# Patient Record
Sex: FEMALE | Race: WHITE | NOT HISPANIC OR LATINO | Employment: FULL TIME | URBAN - METROPOLITAN AREA
[De-identification: names, ages, dates, MRNs, and addresses within clinical notes are randomized per-mention and may not be internally consistent; named-entity substitution may affect disease eponyms.]

---

## 2017-12-11 NOTE — PATIENT INSTRUCTIONS

## 2017-12-11 NOTE — PROGRESS NOTES
HCM:  Hep C done by rheumatology and negative  Colon Cancer Screen- never   Mammo Screen on 2012: Impression BI-RADS - 1, negative.   Flu vaccine declined   SUBJECTIVE:   CC: Bonny Alfredo is an 52 year old woman who presents for preventive health visit.     CULLEN for [facility] for pap results  2012 was last mammogram  Eye iritis [place] [facility] needs to schedule Glasses  Dental UTD    Xmas with sister  Mother  Thanksgiving day  Living with dad for now during transition  Going home for the weekend- [place]    Sleep 7 pm to 3 am  Appetite ok  Exercise volleyball    Smoking no  ETOH rare  Street drugs/MJ no  Caffeine coffee and pop    Pain 1/10  Stiff in am 15 min.    No seizure or LOC  Fall in October on stairs without injury    Travel no  Exposure no        Healthy Habits:    Do you get at least three servings of calcium containing foods daily (dairy, green leafy vegetables, etc.)? yes    Amount of exercise or daily activities, outside of work: 1 day(s) per week    Problems taking medications regularly No    Medication side effects: No    Have you had an eye exam in the past two years? yes    Do you see a dentist twice per year? yes    Do you have sleep apnea, excessive snoring or daytime drowsiness?no      HEARING FREQUENCY:   Right Ear: Passed   Left Ear: Passed    Left ear swishing noise and ache.     Today's PHQ-2 Score:   PHQ-2 (  [employer]) 2017   Q1: Little interest or pleasure in doing things 0   Q2: Feeling down, depressed or hopeless 0   PHQ-2 Score 0         Abuse: Current or Past(Physical, Sexual or Emotional)- No  Do you feel safe in your environment - Yes  Social History   Substance Use Topics     Smoking status: Never Smoker     Smokeless tobacco: Never Used     Alcohol use Yes      Comment: twice per month- wine     The patient does not drink >3 drinks per day nor >7 drinks per week.    Reviewed orders with patient.  Reviewed health maintenance and updated orders  "accordingly - Yes  BP Readings from Last 3 Encounters:   12/14/17 118/64   07/13/16 124/80   06/30/16 124/72    Wt Readings from Last 3 Encounters:   12/14/17 83.9 kg (185 lb)   07/13/16 91.2 kg (201 lb)   06/30/16 93.4 kg (206 lb)       Estimated body mass index is 33.62 kg/(m^2) as calculated from the following:    Height as of this encounter: 1.58 m (5' 2.2\").    Weight as of this encounter: 83.9 kg (185 lb).  Weight loss is recommended           Patient Active Problem List   Diagnosis     Ankylosing spondylitis (H)     Iritis     Past Surgical History:   Procedure Laterality Date     TONSILLECTOMY & ADENOIDECTOMY      Age 4       Social History   Substance Use Topics     Smoking status: Never Smoker     Smokeless tobacco: Never Used     Alcohol use Yes      Comment: twice per month- wine     Family History   Problem Relation Age of Onset     CANCER Mother      Lymphoma     Chronic Obstructive Pulmonary Disease Maternal Grandfather      HEART DISEASE Paternal Grandfather      ?cerebral aneurysm         Current Outpatient Prescriptions   Medication Sig Dispense Refill     Ibuprofen (ADVIL PO)        cycloSPORINE (RESTASIS) 0.05 % ophthalmic emulsion 1 drop 2 times daily       Omega-3 Fatty Acids (OMEGA-3 FISH OIL PO) Take 1 g by mouth       Allergies   Allergen Reactions     Erythromycin GI Disturbance     Recent Labs   Lab Test  12/06/12   1122   LDL  99   HDL  47   TRIG  106   ALT  10   CR  0.90   POTASSIUM  3.7              Patient over age 50, mutual decision to screen reflected in health maintenance.      Pertinent mammograms are reviewed under the imaging tab.  History of abnormal Pap smear: NO - age 30- 65 PAP every 3 years recommended    Reviewed and updated as needed this visit by clinical staff         Reviewed and updated as needed this visit by Provider              ROS:  C: NEGATIVE for fever, chills, change in weight  I: NEGATIVE for worrisome rashes, moles or lesions  E: NEGATIVE for vision changes " "or irritation  ENT: NEGATIVE for ear, mouth and throat problems  R: NEGATIVE for significant cough or SOB  B: NEGATIVE for masses, tenderness or discharge  CV: NEGATIVE for chest pain, palpitations or peripheral edema  GI: NEGATIVE for nausea, abdominal pain, heartburn, or change in bowel habits  : NEGATIVE for unusual urinary or vaginal symptoms. Periods are regular.  M: NEGATIVE for significant arthralgias or myalgia  N: NEGATIVE for weakness, dizziness or paresthesias  E: NEGATIVE for temperature intolerance, skin/hair changes  H: NEGATIVE for bleeding problems  P: NEGATIVE for changes in mood or affect    LMP Thanksgiving  No sexual activity  OBJECTIVE:   /64  Pulse 60  Ht 1.58 m (5' 2.2\")  Wt 83.9 kg (185 lb)  LMP 11/23/2017  SpO2 99%  BMI 33.62 kg/m2  EXAM:  GENERAL: healthy, alert and no distress  EYES: Eyes grossly normal to inspection, PERRL and conjunctivae and sclerae normal  HENT: ear canals and TM's normal, nose and mouth without ulcers or lesions  NECK: no adenopathy, no asymmetry, masses, or scars and thyroid normal to palpation  RESP: lungs clear to auscultation - no rales, rhonchi or wheezes  BREAST: normal without masses, tenderness or nipple discharge and no palpable axillary masses or adenopathy  CV: regular rate and rhythm, normal S1 S2, no S3 or S4, no murmur, click or rub, no peripheral edema and peripheral pulses strong  ABDOMEN: soft, nontender, no hepatosplenomegaly, no masses and bowel sounds normal  MS: no gross musculoskeletal defects noted, no edema  SKIN: no suspicious lesions or rashes  NEURO: Normal strength and tone, mentation intact and speech normal  PSYCH: mentation appears normal, affect normal/bright  LYMPH: no cervical, supraclavicular, axillary, or inguinal adenopathy    Pelvic done elsewhere    ASSESSMENT/PLAN:       ICD-10-CM    1. Routine general medical examination at a health care facility Z00.00 Comp. Metabolic Panel (14) ([facility])     Lipid Panel " "([facility])     CBC with Diff/Plt ([facility])     Vitamin D  25-Hydroxy ([facility])   2. Encounter for screening mammogram for breast cancer Z12.31 MAMMO -  Screening Digital Bilateral ([facility])   3. Grief F43.20    4. BMI 33.0-33.9,adult Z68.33        COUNSELING:   Reviewed preventive health counseling, as reflected in patient instructions       Regular exercise       Healthy diet/nutrition       Vision screening       Advance Care Planning         reports that she has never smoked. She has never used smokeless tobacco.    Estimated body mass index is 35.61 kg/(m^2) as calculated from the following:    Height as of 7/13/16: 1.6 m (5' 3\").    Weight as of 7/13/16: 91.2 kg (201 lb).   Weight management plan: Discussed healthy diet and exercise guidelines and patient will follow up in 12 months in clinic to re-evaluate.   Estimated body mass index is 33.62 kg/(m^2) as calculated from the following:    Height as of this encounter: 1.58 m (5' 2.2\").    Weight as of this encounter: 83.9 kg (185 lb).  Weight loss is recommended    ASSESSMENT / PLAN:  (Z00.00) Routine general medical examination at a health care facility  (primary encounter diagnosis)  Comment: f/u one year  Plan: Comp. Metabolic Panel (14) ([facility]), Lipid         Panel ([facility]), CBC with Diff/Plt ([facility]),         Vitamin D  25-Hydroxy ([facility])            (Z12.31) Encounter for screening mammogram for breast cancer  Comment:   Plan: MAMMO -  Screening Digital Bilateral ([facility])            (F43.20) Grief  Comment:   Plan: Condolences. Discussed grief support group/ book     (Z68.33) BMI 33.0-33.9,adult  Comment:   Plan: Diet and exercise. Discussed MIRIAN My Fitness Pal.      Preventive Health Recommendations  Female Ages 50 - 64    Yearly exam: See your health care provider every year in order to  o Review health changes.   o Discuss preventive care.    o Review your medicines if your doctor has prescribed any.      Get a Pap test " every three years (unless you have an abnormal result and your provider advises testing more often).    If you get Pap tests with HPV test, you only need to test every 5 years, unless you have an abnormal result.     You do not need a Pap test if your uterus was removed (hysterectomy) and you have not had cancer.    You should be tested each year for STDs (sexually transmitted diseases) if you're at risk.     Have a mammogram every 1 to 2 years.    Have a colonoscopy at age 50, or have a yearly FIT test (stool test). These exams screen for colon cancer.      Have a cholesterol test every 5 years, or more often if advised.    Have a diabetes test (fasting glucose) every three years. If you are at risk for diabetes, you should have this test more often.     If you are at risk for osteoporosis (brittle bone disease), think about having a bone density scan (DEXA).    Shots: Get a flu shot each year. Get a tetanus shot every 10 years.    Nutrition:     Eat at least 5 servings of fruits and vegetables each day.    Eat whole-grain bread, whole-wheat pasta and brown rice instead of white grains and rice.    Talk to your provider about Calcium and Vitamin D.     Lifestyle    Exercise at least 150 minutes a week (30 minutes a day, 5 days a week). This will help you control your weight and prevent disease.    Limit alcohol to one drink per day.    No smoking.     Wear sunscreen to prevent skin cancer.     See your dentist every six months for an exam and cleaning.    See your eye doctor every 1 to 2 years.    The Grief Recovery Handbook: The Action Program for Moving Beyond Death, Divorce, and other Losses by Guilherme Blackwell and Cesar Hu.     Labs today    Diet and exercise as discussed.        Counseling Resources:  ATP IV Guidelines  Pooled Cohorts Equation Calculator  Breast Cancer Risk Calculator  FRAX Risk Assessment  ICSI Preventive Guidelines  Dietary Guidelines for Americans, 2010  USDA's MyPlate  ASA  Prophylaxis  Lung CA Screening    Lauryn Smith MD  [facility]

## 2017-12-14 ENCOUNTER — OFFICE VISIT (OUTPATIENT)
Dept: FAMILY MEDICINE | Facility: CLINIC | Age: 52
End: 2017-12-14

## 2017-12-14 VITALS
HEIGHT: 62 IN | OXYGEN SATURATION: 99 % | HEART RATE: 60 BPM | DIASTOLIC BLOOD PRESSURE: 64 MMHG | BODY MASS INDEX: 34.04 KG/M2 | WEIGHT: 185 LBS | SYSTOLIC BLOOD PRESSURE: 118 MMHG

## 2017-12-14 DIAGNOSIS — F43.21 GRIEF: ICD-10-CM

## 2017-12-14 DIAGNOSIS — Z12.31 ENCOUNTER FOR SCREENING MAMMOGRAM FOR BREAST CANCER: ICD-10-CM

## 2017-12-14 DIAGNOSIS — Z00.00 ROUTINE GENERAL MEDICAL EXAMINATION AT A HEALTH CARE FACILITY: Primary | ICD-10-CM

## 2017-12-14 LAB
% GRANULOCYTES: 65.8 % (ref 42.2–75.2)
HCT VFR BLD AUTO: 42.5 % (ref 35–46)
HEMOGLOBIN: 13.9 G/DL (ref 11.8–15.5)
LYMPHOCYTES NFR BLD AUTO: 26.4 % (ref 20.5–51.1)
MCH RBC QN AUTO: 28.7 PG (ref 27–31)
MCHC RBC AUTO-ENTMCNC: 32.7 G/DL (ref 33–37)
MCV RBC AUTO: 87.9 FL (ref 80–100)
MONOCYTES NFR BLD AUTO: 7.8 % (ref 1.7–9.3)
PLATELET # BLD AUTO: 266 K/UL (ref 140–450)
RBC # BLD AUTO: 4.84 X10/CMM (ref 3.7–5.2)
WBC # BLD AUTO: 8 X10/CMM (ref 3.8–11)

## 2017-12-14 PROCEDURE — 82306 VITAMIN D 25 HYDROXY: CPT | Mod: 90 | Performed by: FAMILY MEDICINE

## 2017-12-14 PROCEDURE — 99396 PREV VISIT EST AGE 40-64: CPT | Performed by: FAMILY MEDICINE

## 2017-12-14 PROCEDURE — 80053 COMPREHEN METABOLIC PANEL: CPT | Mod: 90 | Performed by: FAMILY MEDICINE

## 2017-12-14 PROCEDURE — 36415 COLL VENOUS BLD VENIPUNCTURE: CPT | Performed by: FAMILY MEDICINE

## 2017-12-14 PROCEDURE — 85025 COMPLETE CBC W/AUTO DIFF WBC: CPT | Performed by: FAMILY MEDICINE

## 2017-12-14 PROCEDURE — 80061 LIPID PANEL: CPT | Mod: 90 | Performed by: FAMILY MEDICINE

## 2017-12-14 NOTE — MR AVS SNAPSHOT
After Visit Summary   12/14/2017    Bonny Alfredo    MRN: [id number]           Patient Information     Date Of Birth          1965        Visit Information        Provider Department      12/14/2017 9:30 AM Lauryn Smith MD [facility]        Today's Diagnoses     Routine general medical examination at a health care facility    -  1      Care Instructions      Preventive Health Recommendations  Female Ages 50 - 64    Yearly exam: See your health care provider every year in order to  o Review health changes.   o Discuss preventive care.    o Review your medicines if your doctor has prescribed any.      Get a Pap test every three years (unless you have an abnormal result and your provider advises testing more often).    If you get Pap tests with HPV test, you only need to test every 5 years, unless you have an abnormal result.     You do not need a Pap test if your uterus was removed (hysterectomy) and you have not had cancer.    You should be tested each year for STDs (sexually transmitted diseases) if you're at risk.     Have a mammogram every 1 to 2 years.    Have a colonoscopy at age 50, or have a yearly FIT test (stool test). These exams screen for colon cancer.      Have a cholesterol test every 5 years, or more often if advised.    Have a diabetes test (fasting glucose) every three years. If you are at risk for diabetes, you should have this test more often.     If you are at risk for osteoporosis (brittle bone disease), think about having a bone density scan (DEXA).    Shots: Get a flu shot each year. Get a tetanus shot every 10 years.    Nutrition:     Eat at least 5 servings of fruits and vegetables each day.    Eat whole-grain bread, whole-wheat pasta and brown rice instead of white grains and rice.    Talk to your provider about Calcium and Vitamin D.     Lifestyle    Exercise at least 150 minutes a week (30 minutes a day, 5 days a week). This will help you control  "your weight and prevent disease.    Limit alcohol to one drink per day.    No smoking.     Wear sunscreen to prevent skin cancer.     See your dentist every six months for an exam and cleaning.    See your eye doctor every 1 to 2 years.    The Grief Recovery Handbook: The Action Program for Moving Beyond Death, Divorce, and other Losses by Guilherme Blackwell and Cesar Hu.     Labs today    Diet and exercise as discussed.            Follow-ups after your visit        Who to contact     If you have questions or need follow up information about today's clinic visit or your schedule please contact [facility] directly at [phone].  Normal or non-critical lab and imaging results will be communicated to you by PowerFilehart, letter or phone within 4 business days after the clinic has received the results. If you do not hear from us within 7 days, please contact the clinic through [email or web address] or phone. If you have a critical or abnormal lab result, we will notify you by phone as soon as possible.  Submit refill requests through [employer] or call your pharmacy and they will forward the refill request to us. Please allow 3 business days for your refill to be completed.          Additional Information About Your Visit        MyChart Information     SUPR gives you secure access to your electronic health record. If you see a primary care provider, you can also send messages to your care team and make appointments. If you have questions, please call your primary care clinic.  If you do not have a primary care provider, please call [phone] and they will assist you.        Care EveryWhere ID     This is your Care EveryWhere ID. This could be used by other organizations to access your [place] medical records  [id number]        Your Vitals Were     Pulse Height Last Period Pulse Oximetry BMI (Body Mass Index)       60 1.58 m (5' 2.2\") 11/23/2017 99% 33.62 kg/m2        Blood Pressure from Last 3 Encounters:   12/14/17 " 118/64   07/13/16 124/80   06/30/16 124/72    Weight from Last 3 Encounters:   12/14/17 83.9 kg (185 lb)   07/13/16 91.2 kg (201 lb)   06/30/16 93.4 kg (206 lb)              We Performed the Following     CBC with Diff/Plt ([facility])     Comp. Metabolic Panel (14) ([facility])     Lipid Panel ([facility])     Vitamin D  25-Hydroxy ([facility])        Primary Care Provider Office Phone # Fax #    Lissette H Jaciel Ma [phone]       [facility] [address] [place] [address]        Equal Access to Services     MARIUSZ GRANT : Hadii shari braun hadasho Soenrikeali, waaxda luqadaha, qaybta kaalmada adeegyada, shirley edwards . So [facility] [phone].    ATENCIÓN: Si habla español, tiene a rossi disposición servicios gratuitos de asistencia lingüística. LlMount [facility] [phone].    We comply with applicable federal civil rights laws and [place] laws. We do not discriminate on the basis of race, color, national origin, age, disability, sex, sexual orientation, or gender identity.            Thank you!     Thank you for choosing [facility]  for your care. Our goal is always to provide you with excellent care. Hearing back from our patients is one way we can continue to improve our services. Please take a few minutes to complete the written survey that you may receive in the mail after your visit with us. Thank you!             Your Updated Medication List - Protect others around you: Learn how to safely use, store and throw away your medicines at www.disposemymeds.org.          This list is accurate as of: 12/14/17 10:23 AM.  Always use your most recent med list.                   Brand Name Dispense Instructions for use Diagnosis    ADVIL PO           cycloSPORINE 0.05 % ophthalmic emulsion    RESTASIS     1 drop 2 times daily        OMEGA-3 FISH OIL PO      Take 1 g by mouth

## 2017-12-15 LAB
ALBUMIN SERPL-MCNC: 4.4 G/DL (ref 3.5–5.5)
ALBUMIN/GLOB SERPL: 1.8 {RATIO} (ref 1.2–2.2)
ALP SERPL-CCNC: 50 IU/L (ref 39–117)
ALT SERPL-CCNC: 9 IU/L (ref 0–32)
AST SERPL-CCNC: 11 IU/L (ref 0–40)
BILIRUB SERPL-MCNC: 0.9 MG/DL (ref 0–1.2)
BUN SERPL-MCNC: 13 MG/DL (ref 6–24)
BUN/CREATININE RATIO: 14 (ref 9–23)
CALCIUM SERPL-MCNC: 8.8 MG/DL (ref 8.7–10.2)
CHLORIDE SERPLBLD-SCNC: 100 MMOL/L (ref 96–106)
CHOLEST SERPL-MCNC: 204 MG/DL (ref 100–199)
CREAT SERPL-MCNC: 0.92 MG/DL (ref 0.57–1)
EGFR IF AFRICN AM: 82 ML/MIN/1.73
EGFR IF NONAFRICN AM: 71 ML/MIN/1.73
GLOBULIN, TOTAL: 2.4 G/DL (ref 1.5–4.5)
GLUCOSE SERPL-MCNC: 88 MG/DL (ref 65–99)
HDLC SERPL-MCNC: 57 MG/DL
LDL/HDL RATIO: 2.2 RATIO UNITS (ref 0–3.2)
LDLC SERPL CALC-MCNC: 127 MG/DL (ref 0–99)
POTASSIUM SERPL-SCNC: 4.4 MMOL/L (ref 3.5–5.2)
PROT SERPL-MCNC: 6.8 G/DL (ref 6–8.5)
SODIUM SERPL-SCNC: 141 MMOL/L (ref 134–144)
TOTAL CO2: 26 MMOL/L (ref 18–28)
TRIGL SERPL-MCNC: 100 MG/DL (ref 0–149)
VITAMIN D, 25-HYDROXY: 20.6 NG/ML (ref 30–100)
VLDLC SERPL CALC-MCNC: 20 MG/DL (ref 5–40)

## 2017-12-15 NOTE — PROGRESS NOTES
CMP results were normal  Lipids were ok   LDL Cholesterol Calculated       Date                     Value               Ref Range           Status                12/14/2017               127 (H)             0 - 99 mg/dL        Final            ----------]  Vitamin D is low. Take a supplement 2000 IU daily.

## 2017-12-19 PROBLEM — F43.21 GRIEF: Status: ACTIVE | Noted: 2017-12-19

## 2018-01-31 ENCOUNTER — TELEPHONE (OUTPATIENT)
Dept: FAMILY MEDICINE | Facility: CLINIC | Age: 53
End: 2018-01-31

## 2018-04-12 ENCOUNTER — OFFICE VISIT (OUTPATIENT)
Dept: FAMILY MEDICINE | Facility: CLINIC | Age: 53
End: 2018-04-12

## 2018-04-12 VITALS
DIASTOLIC BLOOD PRESSURE: 86 MMHG | RESPIRATION RATE: 16 BRPM | BODY MASS INDEX: 33.8 KG/M2 | OXYGEN SATURATION: 95 % | HEART RATE: 93 BPM | WEIGHT: 186 LBS | SYSTOLIC BLOOD PRESSURE: 126 MMHG

## 2018-04-12 DIAGNOSIS — D17.24 LIPOMA OF LEFT THIGH: Primary | ICD-10-CM

## 2018-04-12 PROCEDURE — 99213 OFFICE O/P EST LOW 20 MIN: CPT | Performed by: FAMILY MEDICINE

## 2018-04-12 NOTE — MR AVS SNAPSHOT
After Visit Summary   4/12/2018    Bonny Alfredo    MRN: [id number]           Patient Information     Date Of Birth          1965        Visit Information        Provider Department      4/12/2018 7:45 AM Elvia Hicks MD [facility]        Today's Diagnoses     Lipoma of left thigh    -  1       Follow-ups after your visit        Additional Services     [facility]       [facility]  Phone: [phone]  Fax: [phone]  Reason for referral: Left leg Ultrasound                                 Soft tissue lump-probable Lipoma                  Who to contact     If you have questions or need follow up information about today's clinic visit or your schedule please contact [facility] directly at [phone].  Normal or non-critical lab and imaging results will be communicated to you by MyChart, letter or phone within 4 business days after the clinic has received the results. If you do not hear from us within 7 days, please contact the clinic through NOTIKhart or phone. If you have a critical or abnormal lab result, we will notify you by phone as soon as possible.  Submit refill requests through [employer] or call your pharmacy and they will forward the refill request to us. Please allow 3 business days for your refill to be completed.          Additional Information About Your Visit        MyChart Information     [employer] gives you secure access to your electronic health record. If you see a primary care provider, you can also send messages to your care team and make appointments. If you have questions, please call your primary care clinic.  If you do not have a primary care provider, please call [phone] and they will assist you.        Care EveryWhere ID     This is your Care EveryWhere ID. This could be used by other organizations to access your [place] medical records  [id number]        Your Vitals Were     Pulse  Respirations Pulse Oximetry BMI (Body Mass Index)          93 16 95% 33.8 kg/m2         Blood Pressure from Last 3 Encounters:   04/12/18 126/86   12/14/17 118/64   07/13/16 124/80    Weight from Last 3 Encounters:   04/12/18 84.4 kg (186 lb)   12/14/17 83.9 kg (185 lb)   07/13/16 91.2 kg (201 lb)              We Performed the Following     Referral to [facility]        Primary Care Provider Office Phone # Fax #    Lauryn Smith [phone] [address]  [facility] [address]        Equal Access to Services     [facility] Center[facility] : Hadii shari johnsono [place], waaxda luqadaha, qaybta kaalmada adekrzysztofyada, shirley bradshaw. So [facility] [phone].    ATENCIÓN: Si habla español, tiene a rossi disposición servicios gratuitos de asistencia lingüística. Llame al [phone].    We comply with applicable federal civil rights laws and [place] laws. We do not discriminate on the basis of race, color, national origin, age, disability, sex, sexual orientation, or gender identity.            Thank you!     Thank you for choosing [facility]  for your care. Our goal is always to provide you with excellent care. Hearing back from our patients is one way we can continue to improve our services. Please take a few minutes to complete the written survey that you may receive in the mail after your visit with us. Thank you!             Your Updated Medication List - Protect others around you: Learn how to safely use, store and throw away your medicines at www.disposemymeds.org.          This list is accurate as of 4/12/18  6:18 PM.  Always use your most recent med list.                   Brand Name Dispense Instructions for use Diagnosis    ADVIL PO           cycloSPORINE 0.05 % ophthalmic emulsion    RESTASIS     1 drop 2 times daily        OMEGA-3 FISH OIL PO      Take 1 g by mouth

## 2018-04-12 NOTE — PROGRESS NOTES
Problem(s) Oriented visit        SUBJECTIVE:                                                    Bonny Alfredo is a 53 year old female who presents to clinic today for a lump on the distal left thigh that has been present for a couple of years but now feels like it is causing pain into the upper thigh. She is a bit paranoid because her mother just  of squamous cell cancer and had history of lymphoma. She personally has no history of cancer. No unexplained weight loss, but some weight loss that she attributes to grief.       Problem list, Medication list, Allergies, and Medical/Social/Surgical histories reviewed in EPIC and updated as appropriate.   Additional history: as documented    ROS:  5 point ROS completed and negative except noted above, including Gen, CV, Resp, GI, MS      Histories:   Patient Active Problem List   Diagnosis     Ankylosing spondylitis (H)     Iritis     BMI 33.0-33.9,adult     Grief     Past Surgical History:   Procedure Laterality Date     TONSILLECTOMY & ADENOIDECTOMY      Age 4       Social History   Substance Use Topics     Smoking status: Never Smoker     Smokeless tobacco: Never Used     Alcohol use Yes      Comment: twice per month- wine     Family History   Problem Relation Age of Onset     CANCER Mother      Lymphoma     Chronic Obstructive Pulmonary Disease Maternal Grandfather      HEART DISEASE Paternal Grandfather      ?cerebral aneurysm           OBJECTIVE:                                                    /86  Pulse 93  Resp 16  Wt 84.4 kg (186 lb)  SpO2 95%  BMI 33.8 kg/m2  Body mass index is 33.8 kg/(m^2).   GENERAL APPEARANCE: Alert, no acute distress  MS: extremities normal, no peripheral edema  SKIN: on the anterior left thigh just proximal to the patella there is a spongy feeling mass that is 5 x 10 cm, mildly tender, non-fluctuant, no overlying erythema. No other abnormality palpated in the anterior thigh, but there is some tenderness proximal to the  lesion.  NEURO: Alert, oriented, speech and mentation normal  PSYCH: mentation appears normal, affect and mood normal   Labs Resulted Today:   Results for orders placed or performed in visit on 12/14/17   Comp. Metabolic Panel (14) ([facility])   Result Value Ref Range    Glucose 88 65 - 99 mg/dL    Urea Nitrogen 13 6 - 24 mg/dL    Creatinine 0.92 0.57 - 1.00 mg/dL    eGFR If NonAfricn Am 71 >59 mL/min/1.73    eGFR If Africn Am 82 >59 mL/min/1.73    BUN/Creatinine Ratio 14 9 - 23    Sodium 141 134 - 144 mmol/L    Potassium 4.4 3.5 - 5.2 mmol/L    Chloride 100 96 - 106 mmol/L    Total CO2 26 18 - 28 mmol/L    Calcium 8.8 8.7 - 10.2 mg/dL    Protein Total 6.8 6.0 - 8.5 g/dL    Albumin 4.4 3.5 - 5.5 g/dL    Globulin, Total 2.4 1.5 - 4.5 g/dL    A/G Ratio 1.8 1.2 - 2.2    Bilirubin Total 0.9 0.0 - 1.2 mg/dL    Alkaline Phosphatase 50 39 - 117 IU/L    AST 11 0 - 40 IU/L    ALT 9 0 - 32 IU/L    Narrative    Performed at:  01 - [facility] [address], [place], [place]  [id number]  : Tommie Dan MD, Phone:  [phone]   Lipid Panel ([facility])   Result Value Ref Range    Cholesterol 204 (H) 100 - 199 mg/dL    Triglycerides 100 0 - 149 mg/dL    HDL Cholesterol 57 >39 mg/dL    VLDL Cholesterol Cameron 20 5 - 40 mg/dL    LDL Cholesterol Calculated 127 (H) 0 - 99 mg/dL    LDL/HDL Ratio 2.2 0.0 - 3.2 ratio units    Narrative    Performed at:  01 - [facility] [address], [place], [place]  [id number]  : Tommie Dan MD, Phone:  [phone]   CBC with Diff/Plt ([facility])   Result Value Ref Range    WBC x10/cmm 8.0 3.8 - 11.0 x10/cmm    % Lymphocytes 26.4 20.5 - 51.1 %    % Monocytes 7.8 1.7 - 9.3 %    % Granulocytes 65.8 42.2 - 75.2 %    RBC x10/cmm 4.84 3.7 - 5.2 x10/cmm    Hemoglobin 13.9 11.8 - 15.5 g/dl    Hematocrit 42.5 35 - 46 %    MCV 87.9 80 - 100 fL    MCH 28.7 27.0 - 31.0 pg    MCHC 32.7 (A) 33.0 - 37.0 g/dL    Platelet Count 266 140 - 450 K/uL   Vitamin D  25-Hydroxy ([facility])   Result Value  Ref Range    Vitamin D,25-Hydroxy 20.6 (L) 30.0 - 100.0 ng/mL    Narrative    Performed at:  01 - [facility] [address], [place], [place]  [id number]  : Tommie Dan MD, Phone:  [phone]     ASSESSMENT/PLAN:                                                        Bonny was seen today for mass.    Diagnoses and all orders for this visit:    Lipoma of left thigh  -     Referral to [facility]  This is probably a benign lipoma.  I like to have it better identified therefore [facility] is [facility].  Given its size I anticipate she will want this removed.  Referral will be given to general surgery if this is indeed a lipoma.    There are no Patient Instructions on file for this visit.    The following health maintenance items are reviewed in Epic and correct as of today:  Health Maintenance   Topic Date Due     HEPATITIS C SCREENING  03/13/1982     COLON CANCER SCREEN (SYSTEM ASSIGNED)  03/13/2014     MAMMO SCREEN Q2 YR (SYSTEM ASSIGNED)  11/28/2014     INFLUENZA VACCINE (SYSTEM ASSIGNED)  09/01/2018     PHQ-9 Q6 MONTHS  10/12/2018     DEPRESSION ACTION PLAN Q1 YR  04/12/2019     TETANUS IMMUNIZATION (SYSTEM ASSIGNED)  12/06/2019     PAP Q5 YEARS  07/13/2021     HPV Q5 YEARS (Complete with PAP)  07/13/2021     LIPID SCREEN Q5 YR FEMALE (SYSTEM ASSIGNED)  12/14/2022       Elvia Hicks MD  [facility]  Family Practice  [facility]  [phone]    For any issues my office # is [phone]

## 2018-04-12 NOTE — LETTER
My Depression Action Plan  Name: Bonny Alfredo   Date of Birth 1965  Date: 4/12/2018    My doctor: Lauryn Smith   My clinic: [facility] [address] [place] [address] [phone]          GREEN    ZONE   Good Control    What it looks like:     Things are going generally well. You have normal up s and down s. You may even feel depressed from time to time, but bad moods usually last less than a day.   What you need to do:  1. Continue to care for yourself (see self care plan)  2. Check your depression survival kit and update it as needed  3. Follow your physician s recommendations including any medication.  4. Do not stop taking medication unless you consult with your physician first.           YELLOW         ZONE Getting Worse    What it looks like:     Depression is starting to interfere with your life.     It may be hard to get out of bed; you may be starting to isolate yourself from others.    Symptoms of depression are starting to last most all day and this has happened for several days.     You may have suicidal thoughts but they are not constant.   What you need to do:     1. Call your care team, your response to treatment will improve if you keep your care team informed of your progress. Yellow periods are signs an adjustment may need to be made.     2. Continue your self-care, even if you have to fake it!    3. Talk to someone in your support network    4. Open up your depression survival kit           RED    ZONE Medical Alert - Get Help    What it looks like:     Depression is seriously interfering with your life.     You may experience these or other symptoms: You can t get out of bed most days, can t work or engage in other necessary activities, you have trouble taking care of basic hygiene, or basic responsibilities, thoughts of suicide or death that will not go away, self-injurious behavior.     What you need to do:  1. Call your care team and  request a same-day appointment. If they are not available (weekends or after hours) call your local crisis line, emergency room or 911.            Depression Self Care Plan / Survival Kit    Self-Care for Depression  Here s the deal. Your body and mind are really not as separate as most people think.  What you do and think affects how you feel and how you feel influences what you do and think. This means if you do things that people who feel good do, it will help you feel better.  Sometimes this is all it takes.  There is also a place for medication and therapy depending on how severe your depression is, so be sure to consult with your medical provider and/ or Behavioral Health Consultant if your symptoms are worsening or not improving.     In order to better manage my stress, I will:    Exercise  Get some form of exercise, every day. This will help reduce pain and release endorphins, the  feel good  chemicals in your brain. This is almost as good as taking antidepressants!  This is not the same as joining a gym and then never going! (they count on that by the way ) It can be as simple as just going for a walk or doing some gardening, anything that will get you moving.      Hygiene   Maintain good hygiene (Get out of bed in the morning, Make your bed, Brush your teeth, Take a shower, and Get dressed like you were going to work, even if you are unemployed).  If your clothes don't fit try to get ones that do.    Diet  I will strive to eat foods that are good for me, drink plenty of water, and avoid excessive sugar, caffeine, alcohol, and other mood-altering substances.  Some foods that are helpful in depression are: complex carbohydrates, B vitamins, flaxseed, fish or fish oil, fresh fruits and vegetables.    Psychotherapy  I agree to participate in Individual Therapy (if recommended).    Medication  If prescribed medications, I agree to take them.  Missing doses can result in serious side effects.  I understand that  drinking alcohol, or other illicit drug use, may cause potential side effects.  I will not stop my medication abruptly without first discussing it with my provider.    Staying Connected With Others  I will stay in touch with my friends, family members, and my primary care provider/team.    Use your imagination  Be creative.  We all have a creative side; it doesn t matter if it s oil painting, sand castles, or mud pies! This will also kick up the endorphins.    Witness Beauty  (AKA stop and smell the roses) Take a look outside, even in mid-winter. Notice colors, textures. Watch the squirrels and birds.     Service to others  Be of service to others.  There is always someone else in need.  By helping others we can  get out of ourselves  and remember the really important things.  This also provides opportunities for practicing all the other parts of the program.    Humor  Laugh and be silly!  Adjust your TV habits for less news and crime-drama and more comedy.    Control your stress  Try breathing deep, massage therapy, biofeedback, and meditation. Find time to relax each day.     My support system    Clinic Contact:  Phone number:    Contact 1:  Phone number:    Contact 2:  Phone number:    Ep[employer]alian/:  Phone number:    Therapist:  Phone number:    Local crisis center:    Phone number:    Other community support:  Phone number:

## 2018-04-13 ASSESSMENT — ANXIETY QUESTIONNAIRES
7. FEELING AFRAID AS IF SOMETHING AWFUL MIGHT HAPPEN: SEVERAL DAYS
IF YOU CHECKED OFF ANY PROBLEMS ON THIS QUESTIONNAIRE, HOW DIFFICULT HAVE THESE PROBLEMS MADE IT FOR YOU TO DO YOUR WORK, TAKE CARE OF THINGS AT HOME, OR GET ALONG WITH OTHER PEOPLE: SOMEWHAT DIFFICULT
GAD7 TOTAL SCORE: 4
6. BECOMING EASILY ANNOYED OR IRRITABLE: NOT AT ALL
2. NOT BEING ABLE TO STOP OR CONTROL WORRYING: SEVERAL DAYS
3. WORRYING TOO MUCH ABOUT DIFFERENT THINGS: SEVERAL DAYS
1. FEELING NERVOUS, ANXIOUS, OR ON EDGE: NOT AT ALL
5. BEING SO RESTLESS THAT IT IS HARD TO SIT STILL: NOT AT ALL

## 2018-04-13 ASSESSMENT — PATIENT HEALTH QUESTIONNAIRE - PHQ9: 5. POOR APPETITE OR OVEREATING: SEVERAL DAYS

## 2018-04-14 ASSESSMENT — PATIENT HEALTH QUESTIONNAIRE - PHQ9: SUM OF ALL RESPONSES TO PHQ QUESTIONS 1-9: 4

## 2018-04-14 ASSESSMENT — ANXIETY QUESTIONNAIRES: GAD7 TOTAL SCORE: 4

## 2018-04-16 ENCOUNTER — TRANSFERRED RECORDS (OUTPATIENT)
Dept: FAMILY MEDICINE | Facility: CLINIC | Age: 53
End: 2018-04-16

## 2018-04-25 ENCOUNTER — TELEPHONE (OUTPATIENT)
Dept: FAMILY MEDICINE | Facility: CLINIC | Age: 53
End: 2018-04-25

## 2018-04-25 DIAGNOSIS — R22.42 LUMP OF LEFT THIGH: Primary | ICD-10-CM

## 2018-04-25 NOTE — TELEPHONE ENCOUNTER
----- Message from Elvia Hicks MD sent at 4/17/2018  4:47 PM CDT -----  The size of the lesions that were seen are much smaller than what I palpated, therefore we should proceed with the CT or MRI to evaluate further. I need the input by radiology as to which is better, but I'm guessing MRI. (See my note for description of lesion.)

## 2018-04-25 NOTE — TELEPHONE ENCOUNTER
Discussed results per Dr. Hicks with patient.  [facility]  Melody Cheng

## 2018-06-13 NOTE — TELEPHONE ENCOUNTER
[facility] called back Radiologist rather do MRI instead. I consulted with Melody and gave [facility] verbal order.

## 2018-06-18 ENCOUNTER — TRANSFERRED RECORDS (OUTPATIENT)
Dept: FAMILY MEDICINE | Facility: CLINIC | Age: 53
End: 2018-06-18

## 2018-06-20 ENCOUNTER — TELEPHONE (OUTPATIENT)
Dept: FAMILY MEDICINE | Facility: CLINIC | Age: 53
End: 2018-06-20

## 2018-06-20 DIAGNOSIS — R93.89 ABNORMAL MRI: Primary | ICD-10-CM

## 2018-06-20 NOTE — TELEPHONE ENCOUNTER
Spoke with patient and notified her of the MRI results. She had many questions regarding her MRI. Informed patient that due to the unknown etiology of the MRI It would be best per Dr. Smith to meet with Hemat/Onoco. Patient verbalized understanding. Patient would like to go to Dr. Parker as he treated her mother. Patient also requested copy of MRI sent to her. Results mailed to patient's fathers address as she is currently staying there. [address] [place] [address].    Orders placed and signed for referral  Please call patient to schedule. Bjorn Moralse LPN      6/20/2018    2:44 PM

## 2018-06-20 NOTE — TELEPHONE ENCOUNTER
----- Message from Lauryn Smith MD sent at 2018  9:02 PM CDT -----  Please call with results. Please help the patient schedule a hematology/oncology consult. Unclear the meaning of findings on MRI. I would like their opinion. FHx lymphoma- her mother ()  Patient never smoker.  Lab Results      Component                Value               Date                      WBC                      8.0                 2017            Lab Results      Component                Value               Date                      RBC                      4.84                2017            Lab Results      Component                Value               Date                      HGB                      13.9                2017            Lab Results      Component                Value               Date                      HCT                      42.5                2017            Lab Results      Component                Value               Date                      MCV                      87.9                2017            Lab Results      Component                Value               Date                      MCH                      28.7                2017            Lab Results      Component                Value               Date                      MCHC                     32.7                2017            No results found for: RDW  Lab Results      Component                Value               Date                      PLT                      266                 2017                  Lauryn Smith MD

## 2018-06-21 DIAGNOSIS — R93.89 ABNORMAL MRI: Primary | ICD-10-CM

## 2018-06-26 ENCOUNTER — TRANSFERRED RECORDS (OUTPATIENT)
Dept: FAMILY MEDICINE | Facility: CLINIC | Age: 53
End: 2018-06-26

## 2018-07-03 ENCOUNTER — TRANSFERRED RECORDS (OUTPATIENT)
Dept: FAMILY MEDICINE | Facility: CLINIC | Age: 53
End: 2018-07-03

## 2019-09-30 ENCOUNTER — HEALTH MAINTENANCE LETTER (OUTPATIENT)
Age: 54
End: 2019-09-30

## 2019-12-23 ENCOUNTER — OFFICE VISIT (OUTPATIENT)
Dept: FAMILY MEDICINE | Facility: CLINIC | Age: 54
End: 2019-12-23

## 2019-12-23 VITALS
OXYGEN SATURATION: 95 % | HEIGHT: 63 IN | BODY MASS INDEX: 34.91 KG/M2 | HEART RATE: 55 BPM | DIASTOLIC BLOOD PRESSURE: 62 MMHG | SYSTOLIC BLOOD PRESSURE: 124 MMHG | WEIGHT: 197 LBS

## 2019-12-23 DIAGNOSIS — Z23 NEED FOR TDAP VACCINATION: ICD-10-CM

## 2019-12-23 DIAGNOSIS — E66.01 MORBID OBESITY (H): ICD-10-CM

## 2019-12-23 DIAGNOSIS — M45.5 ANKYLOSING SPONDYLITIS OF THORACOLUMBAR REGION (H): ICD-10-CM

## 2019-12-23 DIAGNOSIS — H20.9 IRITIS: ICD-10-CM

## 2019-12-23 DIAGNOSIS — E78.00 ELEVATED LDL CHOLESTEROL LEVEL: ICD-10-CM

## 2019-12-23 DIAGNOSIS — Z78.0 POST-MENOPAUSE: ICD-10-CM

## 2019-12-23 DIAGNOSIS — E55.9 VITAMIN D DEFICIENCY: ICD-10-CM

## 2019-12-23 DIAGNOSIS — Z00.00 ROUTINE GENERAL MEDICAL EXAMINATION AT A HEALTH CARE FACILITY: Primary | ICD-10-CM

## 2019-12-23 DIAGNOSIS — Z13.6 SCREENING FOR CARDIOVASCULAR CONDITION: ICD-10-CM

## 2019-12-23 LAB — ERYTHROCYTE [SEDIMENTATION RATE] IN BLOOD: 25 MM/HR (ref 0–20)

## 2019-12-23 PROCEDURE — 85651 RBC SED RATE NONAUTOMATED: CPT | Performed by: FAMILY MEDICINE

## 2019-12-23 PROCEDURE — 90715 TDAP VACCINE 7 YRS/> IM: CPT | Performed by: FAMILY MEDICINE

## 2019-12-23 PROCEDURE — 96372 THER/PROPH/DIAG INJ SC/IM: CPT | Mod: 59 | Performed by: FAMILY MEDICINE

## 2019-12-23 PROCEDURE — 99396 PREV VISIT EST AGE 40-64: CPT | Performed by: FAMILY MEDICINE

## 2019-12-23 PROCEDURE — 36415 COLL VENOUS BLD VENIPUNCTURE: CPT | Performed by: FAMILY MEDICINE

## 2019-12-23 PROCEDURE — 99213 OFFICE O/P EST LOW 20 MIN: CPT | Mod: 25 | Performed by: FAMILY MEDICINE

## 2019-12-23 RX ORDER — PREDNISOLONE ACETATE 10 MG/ML
SUSPENSION/ DROPS OPHTHALMIC
COMMUNITY
Start: 2019-11-12 | End: 2023-04-13

## 2019-12-23 RX ORDER — TIMOLOL 5.12 MG/ML
SOLUTION/ DROPS OPHTHALMIC
COMMUNITY
Start: 2019-11-27 | End: 2023-04-13

## 2019-12-23 RX ORDER — BRIMONIDINE TARTRATE 2 MG/ML
SOLUTION/ DROPS OPHTHALMIC
COMMUNITY
Start: 2019-12-13 | End: 2023-04-13

## 2019-12-23 RX ORDER — DORZOLAMIDE HCL 20 MG/ML
SOLUTION/ DROPS OPHTHALMIC
COMMUNITY
Start: 2019-10-09 | End: 2023-04-13

## 2019-12-23 ASSESSMENT — MIFFLIN-ST. JEOR: SCORE: 1453.75

## 2019-12-23 NOTE — PROGRESS NOTES
SUBJECTIVE:   CC: Bonny Alfredo is an 54 year old woman who presents for preventive health visit.     She has Hx Ankylosing Spondylitis with Iritis. She had a flare up this fall and has been given a series of eye gtts. She just stopped this a week ago and her ophthalmologist would like her checked for inflammatory markers.    Healthy Habits:    Do you get at least three servings of calcium containing foods daily (dairy, green leafy vegetables, etc.)? yes    Amount of exercise or daily activities, outside of work: minimal    Problems taking medications regularly No    Medication side effects: No    Have you had an eye exam in the past two years? yes    Do you see a dentist twice per year? yes    Do you have sleep apnea, excessive snoring or daytime drowsiness?no      Today's PHQ-2 Score:   PHQ-2 ( 1999 [employer]) 12/23/2019 12/14/2017   Q1: Little interest or pleasure in doing things 0 0   Q2: Feeling down, depressed or hopeless 0 0   PHQ-2 Score 0 0       Abuse: Current or Past(Physical, Sexual or Emotional)- No  Do you feel safe in your environment? Yes    Have you ever done Advance Care Planning? (For example, a Health Directive, POLST, or a discussion with a medical provider or your loved ones about your wishes): No, advance care planning information given to patient to review.  Patient declined advance care planning discussion at this time.    Social History     Tobacco Use     Smoking status: Never Smoker     Smokeless tobacco: Never Used   Substance Use Topics     Alcohol use: Yes     Comment: twice per month- wine     If you drink alcohol do you typically have >3 drinks per day or >7 drinks per week? No                     Reviewed orders with patient.  Reviewed health maintenance and updated orders accordingly - Yes  Lab work is in process  Labs reviewed in EPIC  BP Readings from Last 3 Encounters:   12/23/19 124/62   04/12/18 126/86   12/14/17 118/64    Wt Readings from Last 3 Encounters:   12/23/19 89.4  kg (197 lb)   04/12/18 84.4 kg (186 lb)   12/14/17 83.9 kg (185 lb)                  Patient Active Problem List   Diagnosis     Ankylosing spondylitis of thoracolumbar region (H)     Iritis     Grief     Elevated LDL cholesterol level     Obesity (BMI 35.0-39.9) with comorbidity (H)     Vitamin D deficiency     Past Surgical History:   Procedure Laterality Date     TONSILLECTOMY & ADENOIDECTOMY      Age 4       Social History     Tobacco Use     Smoking status: Never Smoker     Smokeless tobacco: Never Used   Substance Use Topics     Alcohol use: Yes     Comment: twice per month- wine     Family History   Problem Relation Age of Onset     Cancer Mother         Lymphoma     Chronic Obstructive Pulmonary Disease Maternal Grandfather      Heart Disease Paternal Grandfather         ?cerebral aneurysm         Current Outpatient Medications   Medication Sig Dispense Refill     Omega-3 Fatty Acids (OMEGA-3 FISH OIL PO) Take 1 g by mouth       BETIMOL 0.5 % ophthalmic solution INT 1 GTT IN OU BID       brimonidine (ALPHAGAN) 0.2 % ophthalmic solution INSTILL 1 DROP BY OPTHALMIC ROUTE BID Q DAY OU       cycloSPORINE (RESTASIS) 0.05 % ophthalmic emulsion 1 drop 2 times daily       dorzolamide (TRUSOPT) 2 % ophthalmic solution INSTILL 1 DROP INTO OU BID       Ibuprofen (ADVIL PO)        prednisoLONE acetate (PRED FORTE) 1 % ophthalmic suspension        Allergies   Allergen Reactions     Erythromycin GI Disturbance     Recent Labs   Lab Test 12/14/17  1033 12/06/12  1122   * 99   HDL 57 47   TRIG 100 106   ALT 9 10   CR 0.92 0.90   POTASSIUM 4.4 3.7        Mammogram Screening: Patient over age 50, mutual decision to screen reflected in health maintenance.    Pertinent mammograms are reviewed under the imaging tab.  History of abnormal Pap smear: No, this is done by Dr. Ann  PAP / HPV Latest Ref Rng & Units 7/13/2016   PAP - NIL   HPV 16 DNA NEG Negative   HPV 18 DNA NEG Negative   OTHER HR HPV NEG Negative  "    Reviewed and updated as needed this visit by clinical staff  Allergies  Meds         Reviewed and updated as needed this visit by Provider        Past Medical History:   Diagnosis Date     Ankylosing spondylitis (H)     sees  [facility]     Iritis     Recurrent     Other activity(E029.9) 2012    family hx of lymphoma-mother      Past Surgical History:   Procedure Laterality Date     TONSILLECTOMY & ADENOIDECTOMY      Age 4     OB History    Para Term  AB Living   0 0 0 0 0 0   SAB TAB Ectopic Multiple Live Births   0 0 0 0 0       ROS:  CONSTITUTIONAL: NEGATIVE for fever, chills, change in weight  INTEGUMENTARY/SKIN: NEGATIVE for worrisome rashes, moles or lesions  EYES: Iritis as above  ENT: NEGATIVE for ear, mouth and throat problems  RESP: NEGATIVE for significant cough or SOB  BREAST: NEGATIVE for masses, tenderness or discharge  CV: NEGATIVE for chest pain, palpitations or peripheral edema  GI: NEGATIVE for nausea, abdominal pain, heartburn, or change in bowel habits  : NEGATIVE for unusual urinary or vaginal symptoms. No vaginal bleeding.  MUSCULOSKELETAL: NEGATIVE for significant arthralgias or myalgia  NEURO: NEGATIVE for weakness, dizziness or paresthesias  PSYCHIATRIC: NEGATIVE for changes in mood or affect     OBJECTIVE:   /62 (BP Location: Left arm, Patient Position: Sitting, Cuff Size: Adult Large)   Pulse 55   Ht 1.594 m (5' 2.75\")   Wt 89.4 kg (197 lb)   SpO2 95%   BMI 35.18 kg/m    EXAM:  GENERAL: healthy, alert and no distress  EYES: Eyes grossly normal to inspection, PERRL and conjunctivae and sclerae normal  HENT: ear canals and TM's normal, nose and mouth without ulcers or lesions  NECK: no adenopathy, no asymmetry, masses, or scars and thyroid normal to palpation  RESP: lungs clear to auscultation - no rales, rhonchi or wheezes  BREAST: normal without masses, tenderness or nipple discharge and no palpable axillary masses or adenopathy  CV: regular rate and rhythm, " "normal S1 S2, no S3 or S4, no murmur, click or rub, no peripheral edema and peripheral pulses strong  ABDOMEN: soft, nontender, no hepatosplenomegaly, no masses and bowel sounds normal  MS: no gross musculoskeletal defects noted, no edema  SKIN: no suspicious lesions or rashes  NEURO: Normal strength and tone, mentation intact and speech normal  PSYCH: mentation appears normal, affect normal/bright        ASSESSMENT/PLAN:   Bonny was seen today for physical and headache.    Diagnoses and all orders for this visit:    Routine general medical examination at a health care facility  Healthy appearing 54 year old female. TDaP is given, breast/GYN care through Dr. Ann, DEXA baseline is ordered, due for colon cancer screen.    Ankylosing spondylitis of thoracolumbar region (H)    Iritis  -     Sed Rate Westergren ([facility])  -     C-Reactive Protein  Quant ([facility])    Elevated LDL cholesterol level  -     Lipid Panel ([facility])    Vitamin D deficiency  -     Vitamin D  25-Hydroxy ([facility])  -     DEXA - Hip/Pelvis/Spine (FUTURE/[facility]); Future  Recommend regular daily supplement.    Morbid obesity (H)  Strongly encourage adding exercise with minimum of 30 minutes 5 times weekly.    Post-menopause  -     DEXA - Hip/Pelvis/Spine (FUTURE/[facility]); Future    Screening for cardiovascular condition  -     Lipid Panel ([facility])  -     Comp. Metabolic Panel (14) ([facility])    Need for Tdap vaccination  -     TDAP VACCINE  -     ADMIN 1st VACCINE        COUNSELING:   Reviewed preventive health counseling, as reflected in patient instructions       Regular exercise       Healthy diet/nutrition       Immunizations    Vaccinated for: TDAP             Osteoporosis Prevention/Bone Health       Colon cancer screening    Estimated body mass index is 35.18 kg/m  as calculated from the following:    Height as of this encounter: 1.594 m (5' 2.75\").    Weight as of this encounter: 89.4 kg (197 lb).    Weight management plan: " Discussed healthy diet and exercise guidelines     reports that she has never smoked. She has never used smokeless tobacco.      Counseling Resources:  ATP IV Guidelines  Pooled Cohorts Equation Calculator  Breast Cancer Risk Calculator  FRAX Risk Assessment  ICSI Preventive Guidelines  Dietary Guidelines for Americans, 2010  USDA's MyPlate  ASA Prophylaxis  Lung CA Screening    Elvia Hicks MD  [facility]

## 2019-12-24 LAB
ALBUMIN SERPL-MCNC: 4.5 G/DL (ref 3.5–5.5)
ALBUMIN/GLOB SERPL: 1.9 {RATIO} (ref 1.2–2.2)
ALP SERPL-CCNC: 56 IU/L (ref 39–117)
ALT SERPL-CCNC: 11 IU/L (ref 0–32)
AST SERPL-CCNC: 14 IU/L (ref 0–40)
BILIRUB SERPL-MCNC: 1.2 MG/DL (ref 0–1.2)
BUN SERPL-MCNC: 13 MG/DL (ref 6–24)
BUN/CREATININE RATIO: 14 (ref 9–23)
CALCIUM SERPL-MCNC: 8.8 MG/DL (ref 8.7–10.2)
CHLORIDE SERPLBLD-SCNC: 102 MMOL/L (ref 96–106)
CHOLEST SERPL-MCNC: 199 MG/DL (ref 100–199)
CREAT SERPL-MCNC: 0.91 MG/DL (ref 0.57–1)
CRP SERPL-MCNC: 2 MG/L (ref 0–10)
EGFR IF AFRICN AM: 82 ML/MIN/1.73
EGFR IF NONAFRICN AM: 71 ML/MIN/1.73
GLOBULIN, TOTAL: 2.4 G/DL (ref 1.5–4.5)
GLUCOSE SERPL-MCNC: 94 MG/DL (ref 65–99)
HDLC SERPL-MCNC: 55 MG/DL
LDL/HDL RATIO: 2.3 RATIO (ref 0–3.2)
LDLC SERPL CALC-MCNC: 124 MG/DL (ref 0–99)
POTASSIUM SERPL-SCNC: 4.1 MMOL/L (ref 3.5–5.2)
PROT SERPL-MCNC: 6.9 G/DL (ref 6–8.5)
SODIUM SERPL-SCNC: 142 MMOL/L (ref 134–144)
TOTAL CO2: 27 MMOL/L (ref 20–29)
TRIGL SERPL-MCNC: 100 MG/DL (ref 0–149)
VITAMIN D, 25-HYDROXY: 32.6 NG/ML (ref 30–100)
VLDLC SERPL CALC-MCNC: 20 MG/DL (ref 5–40)

## 2019-12-24 NOTE — PROGRESS NOTES
[facility] Hicks [facility] Melody Cheng

## 2020-01-09 ENCOUNTER — OFFICE VISIT (OUTPATIENT)
Dept: FAMILY MEDICINE | Facility: CLINIC | Age: 55
End: 2020-01-09

## 2020-01-09 VITALS
RESPIRATION RATE: 16 BRPM | SYSTOLIC BLOOD PRESSURE: 118 MMHG | DIASTOLIC BLOOD PRESSURE: 76 MMHG | HEART RATE: 57 BPM | HEIGHT: 63 IN | BODY MASS INDEX: 34.2 KG/M2 | WEIGHT: 193 LBS | OXYGEN SATURATION: 98 %

## 2020-01-09 DIAGNOSIS — L98.9 ENLARGING SKIN LESION: Primary | ICD-10-CM

## 2020-01-09 PROCEDURE — 11402 EXC TR-EXT B9+MARG 1.1-2 CM: CPT | Performed by: FAMILY MEDICINE

## 2020-01-09 PROCEDURE — 99207 ZZC DROP WITH A PROCEDURE: CPT | Performed by: FAMILY MEDICINE

## 2020-01-09 ASSESSMENT — MIFFLIN-ST. JEOR: SCORE: 1435.6

## 2020-01-09 NOTE — PROGRESS NOTES
"S: Bonny is here for removal of an enlarging skin lesion that catches on her clothing and irritates.  O: /76   Pulse 57   Resp 16   Ht 1.594 m (5' 2.75\")   Wt 87.5 kg (193 lb)   SpO2 98%   BMI 34.46 kg/m    Upper left abdomen with a 1.5 cm pigmented, round, pedunculated lesion. Consent was obtained. Area was cleaned with betadine, lidocaine 1% with epi was used for anesthetic. Elliptical excision of the stalk base was done. Specimen was sent for pathology. Four interrupted sutures using 4-0 ethilon were placed with good closure and good hemostasis. EBL 1 mL.  A: enlarging skin lesion  P: wound care discussed. Remove sutures in 7-10 days.  "

## 2020-01-14 LAB
.: NORMAL
CLINICIAN PROVIDED ICD10: NORMAL
PATHOLOGIST PROVIDED ICD10: NORMAL

## 2020-01-16 ENCOUNTER — OFFICE VISIT (OUTPATIENT)
Dept: FAMILY MEDICINE | Facility: CLINIC | Age: 55
End: 2020-01-16

## 2020-01-16 VITALS
RESPIRATION RATE: 16 BRPM | BODY MASS INDEX: 36.78 KG/M2 | SYSTOLIC BLOOD PRESSURE: 112 MMHG | OXYGEN SATURATION: 98 % | DIASTOLIC BLOOD PRESSURE: 78 MMHG | HEART RATE: 71 BPM | WEIGHT: 206 LBS

## 2020-01-16 DIAGNOSIS — D22.5 PIGMENTED HAIRY EPIDERMAL NEVUS: Primary | ICD-10-CM

## 2020-01-16 PROCEDURE — 99207 ZZC NO CHARGE LOS: CPT | Performed by: FAMILY MEDICINE

## 2020-01-16 NOTE — PROGRESS NOTES
Removal of 4 stitches from a well-healed surgical removal site under her left breast.  Steri-Strips were applied.  Reminded her that the wound is not fully healed and to be careful to keep the Steri-Strips in place.

## 2020-01-30 ENCOUNTER — TRANSFERRED RECORDS (OUTPATIENT)
Dept: FAMILY MEDICINE | Facility: CLINIC | Age: 55
End: 2020-01-30

## 2020-01-30 LAB — COLOGUARD-ABSTRACT: NEGATIVE

## 2021-01-15 ENCOUNTER — HEALTH MAINTENANCE LETTER (OUTPATIENT)
Age: 56
End: 2021-01-15

## 2021-03-14 ENCOUNTER — HEALTH MAINTENANCE LETTER (OUTPATIENT)
Age: 56
End: 2021-03-14

## 2021-10-24 ENCOUNTER — HEALTH MAINTENANCE LETTER (OUTPATIENT)
Age: 56
End: 2021-10-24

## 2022-04-10 ENCOUNTER — HEALTH MAINTENANCE LETTER (OUTPATIENT)
Age: 57
End: 2022-04-10

## 2022-10-15 ENCOUNTER — HEALTH MAINTENANCE LETTER (OUTPATIENT)
Age: 57
End: 2022-10-15

## 2023-04-07 NOTE — PROGRESS NOTES
"Female Preventive Health Visit    SUBJECTIVE:    This 58 year old female presents for a routine preventive physical exam.    The patient has the following concerns:     1. Anterior chest wall soreness for 3 weeks. Onset after carrying groceries. Involved twisting and pulling. Applied heating pad. Now fully resolved.      2. Left shoulder with reduced range of motion over last 5 months. Associated with some anterior shoulder pain. Sharp. Eased by rubbing and resting. Right hand dominant.     3. Ank Spond with recurrent iritis (follows with [facility]).  Diagnosed 1987. Has not seen rheumatologist for many years.     OB/Gyn History:    Last Pap Smear: Overdue. Last pap 7/13/16. Declines updating today.    Health Maintenance:  Health maintenance alerts were reviewed and updated as appropriate.  Breast cancer screening:Last mammo 11/28/12.  Overdue.   Colorectal cancer screening: due cologuard      Healthy Habits:  Answers for HPI/ROS submitted by the patient on 4/13/2023  Frequency of exercise:: None  Getting at least 3 servings of Calcium per day:: NO  Diet:: Regular (no restrictions)  Taking medications regularly:: Not Applicable  Medication side effects:: None  Bi-annual eye exam:: NO  Dental care twice a year:: Yes  Sleep apnea or symptoms of sleep apnea:: None  chest pain: Yes  Additional concerns today:: Yes    Hearing Screening:  Right Ear  4000Hz: fail  2000Hz: fail  1000Hz: pass  500Hz: pass    Left Ear  4000Hz: pass  2000Hz: pass  1000Hz: pass  500Hz: pass      OBJECTIVE:  Vitals:    04/13/23 1532   BP: 118/82   Pulse: 73   Resp: 16   Temp: 98.2  F (36.8  C)   SpO2: 97%   Weight: 95.1 kg (209 lb 9.6 oz)   Height: 1.575 m (5' 2\")    Body mass index is 38.34 kg/m .  General: no acute distress, cooperative with exam.  HEENT:  PERRLA. Bilateral TM's, external canals, oropharynx normal.    Neck:  Supple, no lymphadenopathy or thyromegaly   Lungs: clear to auscultation bilaterally, normal respiratory " effort.  Heart:  RRR without murmurs, rubs or gallops.  Normal S1 and S2.  Abdomen: normal bowel sounds, nontender, no palpable organomegaly.  Skin:  No lesions.  No rashes.  Extremities: left shoulder with globally reduced ROM. 5/5 power on testing rotator cuff.   Neuro:  CN II-XII intact, motor & sensory function all intact.    Psych: mental status normal, mood and affect appropriate.        7/13/2016     3:21 PM 4/13/2018     8:44 AM   PHQ   PHQ-9 Total Score 0 4   Q9: Thoughts of better off dead/self-harm past 2 weeks Not at all Not at all       ASSESSMENT / PLAN:  This 58 year old female presents for a routine preventive physical exam. Preventive health topics discussed including adequate exercise and healthy diet. Return to clinic in one year for preventative exam or sooner with any other concerns.  Other issues discussed as noted below.        Routine general medical examination at a health care facility  -     VENOUS COLLECTION    Breast cancer screening by mammogram  -     MA Screening Digital Bilateral; Future    Screening for colorectal cancer  -     COLOGUARD([employer])    Obesity (BMI 35.0-39.9) with comorbidity (H)  Would like to recheck cholesterol today and again in 3-6 months (plans for lifestyle modification).  -     Hemoglobin A1C ([facility])  -     Lipid Profile ([facility]); Future    Ankylosing spondylitis of thoracolumbar region (H)  Feels rheumatology checks were not helpful. Would like to monitor within primary care.   -     Sed Rate Westergren ([facility])    Screening for cardiovascular condition  -     Lipid Panel ([facility])    Adhesive capsulitis of left shoulder  -     Physical Therapy Referral; Future

## 2023-04-13 ENCOUNTER — OFFICE VISIT (OUTPATIENT)
Dept: FAMILY MEDICINE | Facility: CLINIC | Age: 58
End: 2023-04-13

## 2023-04-13 VITALS
TEMPERATURE: 98.2 F | HEIGHT: 62 IN | OXYGEN SATURATION: 97 % | RESPIRATION RATE: 16 BRPM | WEIGHT: 209.6 LBS | HEART RATE: 73 BPM | SYSTOLIC BLOOD PRESSURE: 118 MMHG | BODY MASS INDEX: 38.57 KG/M2 | DIASTOLIC BLOOD PRESSURE: 82 MMHG

## 2023-04-13 DIAGNOSIS — Z12.11 SCREENING FOR COLORECTAL CANCER: ICD-10-CM

## 2023-04-13 DIAGNOSIS — Z00.00 ROUTINE GENERAL MEDICAL EXAMINATION AT A HEALTH CARE FACILITY: Primary | ICD-10-CM

## 2023-04-13 DIAGNOSIS — Z12.31 BREAST CANCER SCREENING BY MAMMOGRAM: ICD-10-CM

## 2023-04-13 DIAGNOSIS — Z13.6 SCREENING FOR CARDIOVASCULAR CONDITION: ICD-10-CM

## 2023-04-13 DIAGNOSIS — R73.03 PRE-DIABETES: ICD-10-CM

## 2023-04-13 DIAGNOSIS — M75.02 ADHESIVE CAPSULITIS OF LEFT SHOULDER: ICD-10-CM

## 2023-04-13 DIAGNOSIS — E66.01 MORBID OBESITY (H): ICD-10-CM

## 2023-04-13 DIAGNOSIS — Z12.12 SCREENING FOR COLORECTAL CANCER: ICD-10-CM

## 2023-04-13 DIAGNOSIS — M45.5 ANKYLOSING SPONDYLITIS OF THORACOLUMBAR REGION (H): ICD-10-CM

## 2023-04-13 PROBLEM — E78.00 ELEVATED LDL CHOLESTEROL LEVEL: Status: RESOLVED | Noted: 2019-12-23 | Resolved: 2023-04-13

## 2023-04-13 PROBLEM — F43.21 GRIEF: Status: RESOLVED | Noted: 2017-12-19 | Resolved: 2023-04-13

## 2023-04-13 PROCEDURE — 99386 PREV VISIT NEW AGE 40-64: CPT | Performed by: FAMILY MEDICINE

## 2023-04-13 PROCEDURE — 36415 COLL VENOUS BLD VENIPUNCTURE: CPT | Performed by: FAMILY MEDICINE

## 2023-04-13 PROCEDURE — 85651 RBC SED RATE NONAUTOMATED: CPT | Performed by: FAMILY MEDICINE

## 2023-04-13 PROCEDURE — 99213 OFFICE O/P EST LOW 20 MIN: CPT | Mod: 25 | Performed by: FAMILY MEDICINE

## 2023-04-13 ASSESSMENT — ENCOUNTER SYMPTOMS
DYSURIA: 0
DIZZINESS: 0
SHORTNESS OF BREATH: 0
HEMATURIA: 0
EYE PAIN: 0
CONSTIPATION: 0
CHILLS: 0
WEAKNESS: 0
ABDOMINAL PAIN: 0
SORE THROAT: 0
MYALGIAS: 0
BREAST MASS: 0
PALPITATIONS: 0
PARESTHESIAS: 0
JOINT SWELLING: 0
COUGH: 0
HEADACHES: 0
DIARRHEA: 0
NAUSEA: 0
NERVOUS/ANXIOUS: 0
HEARTBURN: 0
FREQUENCY: 0
ARTHRALGIAS: 0
FEVER: 0
HEMATOCHEZIA: 0

## 2023-04-14 PROBLEM — R73.03 PRE-DIABETES: Status: ACTIVE | Noted: 2023-04-14

## 2023-04-14 LAB
ERYTHROCYTE [SEDIMENTATION RATE] IN BLOOD: 47 MM/HR (ref 0–20)
HBA1C MFR BLD: 5.8 % (ref 4.8–5.6)

## 2023-04-15 LAB
CHOLEST SERPL-MCNC: 189 MG/DL (ref 100–199)
HDLC SERPL-MCNC: 45 MG/DL
LDL/HDL RATIO: 2.6 RATIO (ref 0–3.2)
LDLC SERPL CALC-MCNC: 117 MG/DL (ref 0–99)
TRIGL SERPL-MCNC: 153 MG/DL (ref 0–149)
VLDLC SERPL CALC-MCNC: 27 MG/DL (ref 5–40)

## 2023-05-11 LAB — NONINV COLON CA DNA+OCC BLD SCRN STL QL: NEGATIVE

## 2023-05-17 ENCOUNTER — TRANSFERRED RECORDS (OUTPATIENT)
Dept: FAMILY MEDICINE | Facility: CLINIC | Age: 58
End: 2023-05-17

## 2023-08-03 DIAGNOSIS — M45.5 ANKYLOSING SPONDYLITIS OF THORACOLUMBAR REGION (H): ICD-10-CM

## 2023-08-03 DIAGNOSIS — E78.1 HIGH TRIGLYCERIDES: Primary | ICD-10-CM

## 2023-08-03 LAB
CHOLESTEROL: 174 MG/DL (ref 100–199)
ERYTHROCYTE [SEDIMENTATION RATE] IN BLOOD: 10 MM/HR (ref 0–20)
FASTING?: YES
HDL (RMG): 43 MG/DL (ref 40–?)
LDL CALCULATED (RMG): 110 MG/DL (ref 0–130)
TRIGLYCERIDES (RMG): 105 MG/DL (ref 0–149)

## 2023-08-03 PROCEDURE — 36415 COLL VENOUS BLD VENIPUNCTURE: CPT | Performed by: FAMILY MEDICINE

## 2023-08-03 PROCEDURE — 80061 LIPID PANEL: CPT | Mod: QW | Performed by: FAMILY MEDICINE

## 2023-08-03 PROCEDURE — 85651 RBC SED RATE NONAUTOMATED: CPT | Performed by: FAMILY MEDICINE

## 2023-10-29 ENCOUNTER — HEALTH MAINTENANCE LETTER (OUTPATIENT)
Age: 58
End: 2023-10-29

## 2024-05-09 ENCOUNTER — OFFICE VISIT (OUTPATIENT)
Dept: FAMILY MEDICINE | Facility: CLINIC | Age: 59
End: 2024-05-09

## 2024-05-09 VITALS
HEIGHT: 64 IN | HEART RATE: 60 BPM | DIASTOLIC BLOOD PRESSURE: 67 MMHG | SYSTOLIC BLOOD PRESSURE: 139 MMHG | OXYGEN SATURATION: 96 % | WEIGHT: 199.6 LBS | BODY MASS INDEX: 34.08 KG/M2

## 2024-05-09 DIAGNOSIS — S76.312D STRAIN OF LEFT HAMSTRING MUSCLE, SUBSEQUENT ENCOUNTER: Primary | ICD-10-CM

## 2024-05-09 DIAGNOSIS — M45.5 ANKYLOSING SPONDYLITIS OF THORACOLUMBAR REGION (H): ICD-10-CM

## 2024-05-09 PROBLEM — E66.01 MORBID OBESITY (H): Status: RESOLVED | Noted: 2019-12-23 | Resolved: 2024-05-09

## 2024-05-09 PROCEDURE — 99213 OFFICE O/P EST LOW 20 MIN: CPT | Performed by: FAMILY MEDICINE

## 2024-05-09 NOTE — PROGRESS NOTES
"SUBJECTIVE:    Bonny Alfredo, is a 59 year old female presenting for the below:     1. Injury to left leg sustained 3/5/24 when stretching in bed. Sudden onset of a pulling sensation behind left knee.  Causing her to limp. No immediate swelling or bruising. Eased by heat and extending knee and ibuprofen. Has gradually improved over this time.       OBJECTIVE:  Vitals:    05/09/24 1323   BP: 139/67   Pulse: 60   SpO2: 96%   Weight: 90.5 kg (199 lb 9.6 oz)   Height: 1.619 m (5' 3.75\")    Body mass index is 34.53 kg/m .  General: no acute distress, cooperative with exam.  Left lower extremity : no swelling or bruising to left popliteal fossa.No defects palpated. All hamstring ligaments intact on resisted flexion at knee. 5/5 power throughout.     ASSESSMENT / PLAN:      Strain of left hamstring muscle, subsequent encounter  Relative rest with paced return to prior activity level  Gentle stretching.    Ankylosing spondylitis of thoracolumbar region (H)  Usually affects left SI area. with associated Iritis in plast. No flares for some time. Has ophthalmologist.       Follow up:  Overdue annual physical.     "

## 2024-05-26 ENCOUNTER — HEALTH MAINTENANCE LETTER (OUTPATIENT)
Age: 59
End: 2024-05-26

## 2025-06-14 ENCOUNTER — HEALTH MAINTENANCE LETTER (OUTPATIENT)
Age: 60
End: 2025-06-14